# Patient Record
Sex: FEMALE | Race: WHITE | NOT HISPANIC OR LATINO | Employment: FULL TIME | URBAN - METROPOLITAN AREA
[De-identification: names, ages, dates, MRNs, and addresses within clinical notes are randomized per-mention and may not be internally consistent; named-entity substitution may affect disease eponyms.]

---

## 2018-11-18 ENCOUNTER — OFFICE VISIT (OUTPATIENT)
Dept: URGENT CARE | Age: 38
End: 2018-11-18
Payer: COMMERCIAL

## 2018-11-18 VITALS
SYSTOLIC BLOOD PRESSURE: 141 MMHG | HEIGHT: 65 IN | BODY MASS INDEX: 29.66 KG/M2 | DIASTOLIC BLOOD PRESSURE: 90 MMHG | TEMPERATURE: 97.7 F | OXYGEN SATURATION: 99 % | RESPIRATION RATE: 16 BRPM | WEIGHT: 178 LBS | HEART RATE: 87 BPM

## 2018-11-18 DIAGNOSIS — J06.9 ACUTE UPPER RESPIRATORY INFECTION: Primary | ICD-10-CM

## 2018-11-18 DIAGNOSIS — J01.40 ACUTE NON-RECURRENT PANSINUSITIS: ICD-10-CM

## 2018-11-18 PROCEDURE — 99213 OFFICE O/P EST LOW 20 MIN: CPT | Performed by: FAMILY MEDICINE

## 2018-11-18 RX ORDER — BLOOD-GLUCOSE CONTROL, NORMAL
EACH MISCELLANEOUS
COMMUNITY
Start: 2016-06-28

## 2018-11-18 RX ORDER — NORGESTIMATE AND ETHINYL ESTRADIOL 7DAYSX3 LO
KIT ORAL DAILY
COMMUNITY
Start: 2004-05-21

## 2018-11-18 RX ORDER — BUSPIRONE HYDROCHLORIDE 15 MG/1
15 TABLET ORAL
COMMUNITY
Start: 2018-07-09 | End: 2019-07-09

## 2018-11-18 RX ORDER — VENLAFAXINE HYDROCHLORIDE 75 MG/1
CAPSULE, EXTENDED RELEASE ORAL
COMMUNITY
Start: 2008-11-24

## 2018-11-18 RX ORDER — LISINOPRIL 5 MG/1
5 TABLET ORAL
COMMUNITY
Start: 2018-04-27 | End: 2019-04-27

## 2018-11-18 RX ORDER — AMOXICILLIN AND CLAVULANATE POTASSIUM 875; 125 MG/1; MG/1
1 TABLET, FILM COATED ORAL EVERY 12 HOURS SCHEDULED
Qty: 20 TABLET | Refills: 0 | Status: SHIPPED | OUTPATIENT
Start: 2018-11-18 | End: 2018-11-28

## 2018-11-18 RX ORDER — METOPROLOL SUCCINATE 50 MG/1
50 TABLET, EXTENDED RELEASE ORAL
COMMUNITY
Start: 2018-08-29

## 2018-11-18 NOTE — PROGRESS NOTES
[facility]        NAME: Noelle Vilchis is a 40 y o  female  : 1980    MRN: [id number]  DATE: 2018  TIME: 10:52 AM    Assessment and Plan   Acute upper respiratory infection [J06 9]  1  Acute upper respiratory infection     2  Acute non-recurrent pansinusitis  amoxicillin-clavulanate (AUGMENTIN) 875-125 mg per tablet         Patient Instructions     Patient Instructions   Augmentin twice a day until finished (please take probiotics)  Cold/cough/sinus medication as needed (try Mucinex DM and Flonase nasal spray 1 spray in each nostril once or twice a day)  Tylenol, or ibuprofen (Advil/Motrin), or try Aleve (please take with food) as needed  Recheck/follow-up with family physician  Please go to the hospital emergency department if needed  Follow up with PCP in 3-5 days  Proceed to  ER if symptoms worsen  Chief Complaint     Chief Complaint   Patient presents with    Sinusitis     pt complains of congestion, facial pain, runny nose, productive cough and coughing fits x1 month, pt took cold/sinus mediation last night and advil this morning          History of Present Illness       Congestion, sinus pressure, cough for the past month        Review of Systems   Review of Systems   HENT: Positive for congestion and sinus pressure  Respiratory: Positive for cough  Cardiovascular: Negative  Musculoskeletal: Negative  Skin: Negative  Neurological: Negative  Current Medications       Current Outpatient Prescriptions:     Blood Glucose Calibration (GLUCOSE CONTROL) Normal SOLN, Glucose control solution provides an easy way to ensure accurate blood glucose testing , Disp: , Rfl:     busPIRone (BUSPAR) 15 mg tablet, Take 15 mg by mouth, Disp: , Rfl:     Glucose Blood (TRUE FOCUS BLOOD GLUCOSE STRIP VI), One Touch Ultra test strips   Test 6 times a day dx E11 9 pregnancy on insulin, Disp: , Rfl:     lisinopril (ZESTRIL) 5 mg tablet, Take 5 mg by mouth, Disp: , Rfl:     metoprolol succinate (TOPROL-XL) 50 mg 24 hr tablet, Take 50 mg by mouth, Disp: , Rfl:     Multiple Vitamins-Minerals (THERAGRAN-M PO), Take 1 tablet by mouth, Disp: , Rfl:     norgestimate-ethinyl estradiol (ORTHO TRI-CYCLEN LO) 0 18/0 215/0 25 MG-25 MCG per tablet, Take by mouth daily, Disp: , Rfl:     sitaGLIPtin (JANUVIA) 100 mg tablet, Take by mouth, Disp: , Rfl:     venlafaxine (EFFEXOR XR) 75 mg 24 hr capsule, Take by mouth, Disp: , Rfl:     amoxicillin-clavulanate (AUGMENTIN) 875-125 mg per tablet, Take 1 tablet by mouth every 12 (twelve) hours for 20 doses, Disp: 20 tablet, Rfl: 0    Current Allergies     Allergies as of 2018 - Reviewed 2018   Allergen Reaction Noted    Lamotrigine Rash     Metformin Diarrhea 2018            The following portions of the patient's history were reviewed and updated as appropriate: allergies, current medications, past family history, past medical history, past social history, past surgical history and problem list      Past Medical History:   Diagnosis Date    Anxiety     Diabetes ([facility] [address] )     Hypertension        Past Surgical History:   Procedure Laterality Date     SECTION         History reviewed  No pertinent family history  Medications have been verified  Objective   /90 (BP Location: Left arm, Patient Position: Sitting, Cuff Size: Standard)   Pulse 87   Temp 97 7 °F (36 5 °C) (Temporal)   Resp 16   Ht 5' 5" (1 651 m)   Wt 80 7 kg (178 lb)   LMP 2018 (Exact Date)   SpO2 99%   Breastfeeding? No   BMI 29 62 kg/m²        Physical Exam     Physical Exam   Constitutional: She is oriented to person, place, and time  She appears well-developed and well-nourished  HENT:   Right Ear: External ear normal    Left Ear: External ear normal    Nasal congestion; tenderness over the sinuses; injection of the oropharynx   Neck: Normal range of motion  Neck supple     Pulmonary/Chest: Effort normal  No respiratory distress  She has no wheezes  Coarse breath sounds   Neurological: She is alert and oriented to person, place, and time  No nuchal rigidity   Skin: Skin is warm  Good color and turgor   Psychiatric: She has a normal mood and affect  Her behavior is normal    Nursing note and vitals reviewed

## 2018-11-18 NOTE — PATIENT INSTRUCTIONS
Augmentin twice a day until finished (please take probiotics)  Cold/cough/sinus medication as needed (try Mucinex DM and Flonase nasal spray 1 spray in each nostril once or twice a day)  Tylenol, or ibuprofen (Advil/Motrin), or try Aleve (please take with food) as needed  Recheck/follow-up with family physician  Please go to the hospital emergency department if needed

## 2019-08-07 ENCOUNTER — OFFICE VISIT (OUTPATIENT)
Dept: URGENT CARE | Age: 39
End: 2019-08-07
Payer: COMMERCIAL

## 2019-08-07 VITALS
HEART RATE: 89 BPM | OXYGEN SATURATION: 98 % | HEIGHT: 65 IN | SYSTOLIC BLOOD PRESSURE: 116 MMHG | TEMPERATURE: 97.9 F | RESPIRATION RATE: 16 BRPM | DIASTOLIC BLOOD PRESSURE: 84 MMHG | WEIGHT: 186 LBS | BODY MASS INDEX: 30.99 KG/M2

## 2019-08-07 DIAGNOSIS — B09 VIRAL RASH: Primary | ICD-10-CM

## 2019-08-07 PROCEDURE — 99213 OFFICE O/P EST LOW 20 MIN: CPT | Performed by: NURSE PRACTITIONER

## 2019-08-07 RX ORDER — LISINOPRIL 5 MG/1
5 TABLET ORAL DAILY
COMMUNITY
Start: 2019-04-11

## 2019-08-07 RX ORDER — BUSPIRONE HYDROCHLORIDE 15 MG/1
15 TABLET ORAL 2 TIMES DAILY
COMMUNITY
Start: 2019-07-29

## 2019-08-07 RX ORDER — VALACYCLOVIR HYDROCHLORIDE 1 G/1
1000 TABLET, FILM COATED ORAL 3 TIMES DAILY
Qty: 21 TABLET | Refills: 0 | Status: SHIPPED | OUTPATIENT
Start: 2019-08-07 | End: 2019-08-14

## 2019-08-07 RX ORDER — MEDROXYPROGESTERONE ACETATE 150 MG/ML
150 INJECTION, SUSPENSION INTRAMUSCULAR
COMMUNITY
Start: 2019-06-24

## 2019-08-07 NOTE — PATIENT INSTRUCTIONS
Viral Exanthem   WHAT YOU NEED TO KNOW:   Viral exanthem is a skin rash  It is your child's body's response to a virus  The rash usually goes away on its own  Your child's rash may last from a few days to a month or more  DISCHARGE INSTRUCTIONS:   Medicines:   · Medicines  to treat fever, pain, and itching may be given  Your child may also receive medicines to treat an infection  · NSAIDs , such as ibuprofen, help decrease swelling, pain, and fever  This medicine is available with or without a doctor's order  NSAIDs can cause stomach bleeding or kidney problems in certain people  If your child takes blood thinner medicine, always ask if NSAIDs are safe for him  Always read the medicine label and follow directions  Do not give these medicines to children under 10months of age without direction from your child's healthcare provider  · Do not give aspirin to children under 25years of age  Your child could develop Reye syndrome if he takes aspirin  Reye syndrome can cause life-threatening brain and liver damage  Check your child's medicine labels for aspirin, salicylates, or oil of wintergreen  Follow up with your child's pediatrician as directed:  Write down your questions so you remember to ask them during your visits  Manage your child's rash:   · Apply calamine lotion on your child's rash  This lotion may help relieve itching  Follow the directions on the label  Do not use this lotion on sores inside your child's mouth  · Give your child baths in lukewarm water  Add ½ cup of baking soda or uncooked oatmeal to the water  Let your child bathe for about 30 minutes  Do this several times a day to help your child stop itching  · Trim your child's fingernails  Put gloves or socks on his hands, especially at night  Wash his hands with germ-killing soap to prevent a bacterial infection  · Keep your child cool  The itching can get worse if your child sweats    Contact your child's healthcare provider if:   · Your child's rash has turned into sores that drain blood or pus  · Your child has repeated diarrhea  · Your child has ear pain or is pulling at his ears  · Your child has joint pain for more than 4 months after his rash has gone away  · You have questions or concerns about your child's condition or care  Return to the emergency department if:   · Your child's temperature is more than 102° F (38 9° C) and he is dizzy when he sits up  · Your child is having seizures  · Your child cannot turn his head without pain or complains of a stiff neck  © 2017 [employer] Information is for End User's use only and may not be sold, redistributed or otherwise used for commercial purposes  All illustrations and images included in CareNotes® are the copyrighted property of [employer]  or [employer]  The above information is an  only  It is not intended as medical advice for individual conditions or treatments  Talk to your doctor, nurse or pharmacist before following any medical regimen to see if it is safe and effective for you

## 2019-08-07 NOTE — PROGRESS NOTES
[facility]        NAME: Tatianna Gregg is a 41 y o  female  : 1980    MRN: [id number]  DATE: 2019  TIME: 11:05 AM    Assessment and Plan   Viral rash [B09]  1  Viral rash  valACYclovir (VALTREX) 1,000 mg tablet         Patient Instructions     Take meds as directed  F/u with PCP to get you tested for HSV  Follow up with PCP in 3-5 days  Proceed to  ER if symptoms worsen  Chief Complaint     Chief Complaint   Patient presents with    Rash     Pt complaining of a rash under her R eye and under her lip  Under her eye has been there for 2 months  Under her lip started 2 weeks ago  Rash is itchy  Denies use of any new products  History of Present Illness       HPI   Reports rash underneath the right eye since 2019, and now underneath the right side of the lip for 2 weeks  Says her mother has been bugging her to come and get it checked  Has an annoying feeling to it  No pain or itching  Review of Systems   Review of Systems   Constitutional: Negative for chills, fatigue and unexpected weight change  HENT: Negative for sore throat and trouble swallowing  Eyes: Negative for visual disturbance  Respiratory: Negative for cough and wheezing  Cardiovascular: Negative for chest pain  Skin: Positive for rash (as described above)  Neurological: Negative for dizziness and numbness  Current Medications       Current Outpatient Medications:     Blood Glucose Calibration (GLUCOSE CONTROL) Normal SOLN, Glucose control solution provides an easy way to ensure accurate blood glucose testing , Disp: , Rfl:     busPIRone (BUSPAR) 15 mg tablet, Take 15 mg by mouth 2 (two) times a day, Disp: , Rfl:     Glucose Blood (TRUE FOCUS BLOOD GLUCOSE STRIP VI), One Touch Ultra test strips   Test 6 times a day dx E11 9 pregnancy on insulin, Disp: , Rfl:     insulin degludec (TRESIBA FLEXTOUCH) 100 units/mL injection pen, Inject up to 40 units once daily E11 65, Disp: , Rfl:   lisinopril (ZESTRIL) 5 mg tablet, Take 5 mg by mouth daily, Disp: , Rfl:     medroxyPROGESTERone acetate (DEPO-PROVERA) 150 mg/mL injection, Inject 150 mg into a muscle, Disp: , Rfl:     metoprolol succinate (TOPROL-XL) 50 mg 24 hr tablet, Take 50 mg by mouth, Disp: , Rfl:     Multiple Vitamins-Minerals (THERAGRAN-M PO), Take 1 tablet by mouth, Disp: , Rfl:     venlafaxine (EFFEXOR XR) 75 mg 24 hr capsule, Take by mouth, Disp: , Rfl:     busPIRone (BUSPAR) 15 mg tablet, Take 15 mg by mouth, Disp: , Rfl:     lisinopril (ZESTRIL) 5 mg tablet, Take 5 mg by mouth, Disp: , Rfl:     norgestimate-ethinyl estradiol (ORTHO TRI-CYCLEN LO) 0 18/0 215/0 25 MG-25 MCG per tablet, Take by mouth daily, Disp: , Rfl:     sitaGLIPtin (JANUVIA) 100 mg tablet, Take by mouth, Disp: , Rfl:     valACYclovir (VALTREX) 1,000 mg tablet, Take 1 tablet (1,000 mg total) by mouth 3 (three) times a day for 7 days, Disp: 21 tablet, Rfl: 0    Current Allergies     Allergies as of 2019 - Reviewed 2019   Allergen Reaction Noted    Lamotrigine Rash     Metformin Diarrhea 2018            The following portions of the patient's history were reviewed and updated as appropriate: allergies, current medications, past family history, past medical history, past social history, past surgical history and problem list      Past Medical History:   Diagnosis Date    Anxiety     Diabetes ([address] )     Hypertension        Past Surgical History:   Procedure Laterality Date     SECTION         Family History   Problem Relation Age of Onset    No Known Problems Mother     No Known Problems Father          Medications have been verified          Objective   /84 (BP Location: Right arm, Patient Position: Sitting)   Pulse 89   Temp 97 9 °F (36 6 °C) (Temporal)   Resp 16   Ht 5' 5" (1 651 m)   Wt 84 4 kg (186 lb)   LMP 2019   SpO2 98%   BMI 30 95 kg/m²        Physical Exam     Physical Exam   Eyes: Pupils are equal, round, and reactive to light  Conjunctivae and EOM are normal  Right eye exhibits no discharge  Left eye exhibits no discharge  There is about 3-4 small rash lesion underneath the right eye lid  Similar rash also present underneath the lip, right side  Measures about 3-4 mm  Slightly elevated  Minimal crusting noted   Shingles Vs HSV

## 2021-03-10 DIAGNOSIS — Z23 ENCOUNTER FOR IMMUNIZATION: ICD-10-CM

## 2021-03-13 ENCOUNTER — IMMUNIZATIONS (OUTPATIENT)
Dept: FAMILY MEDICINE CLINIC | Facility: HOSPITAL | Age: 41
End: 2021-03-13

## 2021-03-13 DIAGNOSIS — Z23 ENCOUNTER FOR IMMUNIZATION: Primary | ICD-10-CM

## 2021-03-13 PROCEDURE — 0001A SARS-COV-2 / COVID-19 MRNA VACCINE (PFIZER-BIONTECH) 30 MCG: CPT

## 2021-03-13 PROCEDURE — 91300 SARS-COV-2 / COVID-19 MRNA VACCINE (PFIZER-BIONTECH) 30 MCG: CPT

## 2021-04-03 ENCOUNTER — IMMUNIZATIONS (OUTPATIENT)
Dept: FAMILY MEDICINE CLINIC | Facility: HOSPITAL | Age: 41
End: 2021-04-03

## 2021-04-03 DIAGNOSIS — Z23 ENCOUNTER FOR IMMUNIZATION: Primary | ICD-10-CM

## 2021-04-03 PROCEDURE — 0002A SARS-COV-2 / COVID-19 MRNA VACCINE (PFIZER-BIONTECH) 30 MCG: CPT

## 2021-04-03 PROCEDURE — 91300 SARS-COV-2 / COVID-19 MRNA VACCINE (PFIZER-BIONTECH) 30 MCG: CPT

## 2021-05-09 ENCOUNTER — OFFICE VISIT (OUTPATIENT)
Dept: URGENT CARE | Age: 41
End: 2021-05-09
Payer: COMMERCIAL

## 2021-05-09 ENCOUNTER — APPOINTMENT (OUTPATIENT)
Dept: RADIOLOGY | Age: 41
End: 2021-05-09
Attending: PHYSICIAN ASSISTANT
Payer: COMMERCIAL

## 2021-05-09 VITALS
SYSTOLIC BLOOD PRESSURE: 143 MMHG | HEART RATE: 112 BPM | RESPIRATION RATE: 20 BRPM | TEMPERATURE: 97.5 F | DIASTOLIC BLOOD PRESSURE: 92 MMHG | OXYGEN SATURATION: 99 %

## 2021-05-09 DIAGNOSIS — R52 PAIN: ICD-10-CM

## 2021-05-09 DIAGNOSIS — S29.011A MUSCLE STRAIN OF CHEST WALL, INITIAL ENCOUNTER: Primary | ICD-10-CM

## 2021-05-09 PROCEDURE — 99213 OFFICE O/P EST LOW 20 MIN: CPT | Performed by: PHYSICIAN ASSISTANT

## 2021-05-09 PROCEDURE — 71101 X-RAY EXAM UNILAT RIBS/CHEST: CPT

## 2021-05-09 NOTE — PATIENT INSTRUCTIONS
Ice area as needed  Recommend splinting the area for any cough or sneeze  Encourage taking deep breaths  Follow-up with your primary care physician if symptoms persist       Go to emergency room if symptoms are worsening  May take ibuprofen or Tylenol as needed

## 2021-05-09 NOTE — PROGRESS NOTES
[facility]        NAME: Akosua Gonzales is a 45 y o  female  : 1980    MRN: [id number]  DATE: May 9, 2021  TIME: 5:51 PM    Assessment and Plan   Muscle strain of chest wall, initial encounter [S29 011A]  1  Muscle strain of chest wall, initial encounter  XR ribs right w pa chest min 3 views         Patient Instructions       Follow up with PCP in 3-5 days  Proceed to  ER if symptoms worsen  Chief Complaint     Chief Complaint   Patient presents with    Rib Pain     pt states had a bad coughing episode on 21 and started with right rib pain, was seen at [facility] and was told she had a strain and pneumonia  Pt states after antibiotics pain got better  On Thursday pt had another coughing fit and developed pain in the same area, hurts to cough or take a deep breath, painful to palpation         History of Present Illness         Patient here for evaluation of pain in her right lateral chest wall  Patient had similar symptoms and was seen at [facility] and had x-rays done which were negative for any fractures  Patient was diagnosed with pneumonia was put on antibiotics  Patient states she did get better and the other day on Tuesday she was having other coughing fit and felt the pain return  She denies any shortness of breath or dyspnea on exertion  Patient has increased pain with movement or deep breathing or coughing or sneezing  She states it does feel better at rest       Review of Systems   Review of Systems   Constitutional: Negative  Respiratory: Positive for cough  Negative for apnea, choking, chest tightness, shortness of breath and wheezing  Cardiovascular: Positive for chest pain  Negative for palpitations and leg swelling           Current Medications       Current Outpatient Medications:     Blood Glucose Calibration (GLUCOSE CONTROL) Normal SOLN, Glucose control solution provides an easy way to ensure accurate blood glucose testing , Disp: , Rfl:    busPIRone (BUSPAR) 15 mg tablet, Take 15 mg by mouth 2 (two) times a day, Disp: , Rfl:     Glucose Blood (TRUE FOCUS BLOOD GLUCOSE STRIP VI), One Touch Ultra test strips  Test 6 times a day dx E11 9 pregnancy on insulin, Disp: , Rfl:     insulin degludec (TRESIBA FLEXTOUCH) 100 units/mL injection pen, Inject up to 40 units once daily E11 65, Disp: , Rfl:     lisinopril (ZESTRIL) 5 mg tablet, Take 5 mg by mouth daily, Disp: , Rfl:     medroxyPROGESTERone acetate (DEPO-PROVERA) 150 mg/mL injection, Inject 150 mg into a muscle, Disp: , Rfl:     metoprolol succinate (TOPROL-XL) 50 mg 24 hr tablet, Take 50 mg by mouth, Disp: , Rfl:     Multiple Vitamins-Minerals (THERAGRAN-M PO), Take 1 tablet by mouth, Disp: , Rfl:     norgestimate-ethinyl estradiol (ORTHO TRI-CYCLEN LO) 0 18/0 215/0 25 MG-25 MCG per tablet, Take by mouth daily, Disp: , Rfl:     sitaGLIPtin (JANUVIA) 100 mg tablet, Take by mouth, Disp: , Rfl:     valACYclovir (VALTREX) 1,000 mg tablet, Take 1 tablet (1,000 mg total) by mouth 3 (three) times a day for 7 days, Disp: 21 tablet, Rfl: 0    venlafaxine (EFFEXOR XR) 75 mg 24 hr capsule, Take by mouth, Disp: , Rfl:     Current Allergies     Allergies as of 2021 - Reviewed 2021   Allergen Reaction Noted    Lamotrigine Rash     Metformin Diarrhea 2018            The following portions of the patient's history were reviewed and updated as appropriate: allergies, current medications, past family history, past medical history, past social history, past surgical history and problem list      Past Medical History:   Diagnosis Date    Anxiety     Diabetes ([address] )     Hypertension        Past Surgical History:   Procedure Laterality Date     SECTION         Family History   Problem Relation Age of Onset    No Known Problems Mother     No Known Problems Father          Medications have been verified          Objective   /92   Pulse (!) 112   Temp 97 5 °F (36 4 °C)   Resp 20   SpO2 99%   No LMP recorded  (Menstrual status: Birth Control)  Physical Exam     Physical Exam  Vitals signs and nursing note reviewed  Constitutional:       General: She is not in acute distress  Appearance: Normal appearance  She is well-developed  She is not ill-appearing, toxic-appearing or diaphoretic  HENT:      Head: Normocephalic and atraumatic  Eyes:      Extraocular Movements: Extraocular movements intact  Conjunctiva/sclera: Conjunctivae normal       Pupils: Pupils are equal, round, and reactive to light  Pulmonary:      Effort: Pulmonary effort is normal  No respiratory distress  Breath sounds: Normal breath sounds  No stridor  No wheezing, rhonchi or rales  Chest:      Chest wall: Tenderness (Patient has some pain in the same area with movement of the torso and right upper extremity ) present  Skin:     General: Skin is warm and dry  Findings: No rash  Neurological:      General: No focal deficit present  Mental Status: She is alert and oriented to person, place, and time  Psychiatric:         Mood and Affect: Mood normal          Behavior: Behavior normal          Thought Content: Thought content normal          Judgment: Judgment normal        X-ray of the right ribs and PA chest show no acute fractures or findings  No evidence of pneumonia

## 2024-07-21 ENCOUNTER — OFFICE VISIT (OUTPATIENT)
Dept: URGENT CARE | Age: 44
End: 2024-07-21
Payer: COMMERCIAL

## 2024-07-21 VITALS
SYSTOLIC BLOOD PRESSURE: 118 MMHG | HEART RATE: 90 BPM | RESPIRATION RATE: 20 BRPM | BODY MASS INDEX: 34.4 KG/M2 | WEIGHT: 206.7 LBS | OXYGEN SATURATION: 97 % | TEMPERATURE: 97.1 F | DIASTOLIC BLOOD PRESSURE: 77 MMHG

## 2024-07-21 DIAGNOSIS — H60.331 ACUTE SWIMMER'S EAR OF RIGHT SIDE: Primary | ICD-10-CM

## 2024-07-21 PROCEDURE — 99213 OFFICE O/P EST LOW 20 MIN: CPT

## 2024-07-21 RX ORDER — OFLOXACIN 3 MG/ML
5 SOLUTION AURICULAR (OTIC) 2 TIMES DAILY
Qty: 10 ML | Refills: 0 | Status: SHIPPED | OUTPATIENT
Start: 2024-07-21

## 2024-07-21 NOTE — PROGRESS NOTES
[facility]        NAME: Genesis Dong is a 43 y.o. female  : 1980    MRN: [id number]  DATE: 2024  TIME: 1:19 PM    Assessment and Plan   Acute swimmer's ear of right side [H60.331]  1. Acute swimmer's ear of right side  ofloxacin (FLOXIN) 0.3 % otic solution            Patient Instructions       Follow up with PCP in 3-5 days.  Proceed to  ER if symptoms worsen.    If tests have been performed at [facility], our office will contact you with results if changes need to be made to the care plan discussed with you at the visit.  You can review your full results on [facility].    Chief Complaint     Chief Complaint   Patient presents with   • Earache     Started on yesterday right ear pain, did get somewhat better . Patient reports started again this morning. Denies fevers or chills. Patient has been swimming a lot .          History of Present Illness       Pt is a 43 year old female presenting with 2 days of right ear pain.  States she was swimming earlier this week with her children, and went under the water.  States her hearing is muffled on the right side.  She denies fever or chills, no drainage from the ear, no headaches.  She is taken ibuprofen with minimal relief.    Earache         Review of Systems   Review of Systems   Constitutional:  Negative for chills and fever.   HENT:  Positive for ear pain. Negative for postnasal drip.          Current Medications       Current Outpatient Medications:   •  busPIRone (BUSPAR) 15 mg tablet, Take 15 mg by mouth 2 (two) times a day, Disp: , Rfl:   •  Glucose Blood (TRUE FOCUS BLOOD GLUCOSE STRIP VI), One Touch Ultra test strips. Test 6 times a day dx E11.9 pregnancy on insulin, Disp: , Rfl:   •  insulin degludec (TRESIBA FLEXTOUCH) 100 units/mL injection pen, Inject up to 40 units once daily E11.65, Disp: , Rfl:   •  lisinopril (ZESTRIL) 5 mg tablet, Take 5 mg by mouth daily, Disp: , Rfl:   •  medroxyPROGESTERone acetate (DEPO-PROVERA)  150 mg/mL injection, Inject 150 mg into a muscle, Disp: , Rfl:   •  metoprolol succinate (TOPROL-XL) 50 mg 24 hr tablet, Take 50 mg by mouth, Disp: , Rfl:   •  Multiple Vitamins-Minerals (THERAGRAN-M PO), Take 1 tablet by mouth, Disp: , Rfl:   •  norgestimate-ethinyl estradiol (ORTHO TRI-CYCLEN LO) 0.18/0.215/0.25 MG-25 MCG per tablet, Take by mouth daily, Disp: , Rfl:   •  ofloxacin (FLOXIN) 0.3 % otic solution, Administer 5 drops to the right ear 2 (two) times a day, Disp: 10 mL, Rfl: 0  •  sitaGLIPtin (JANUVIA) 100 mg tablet, Take by mouth, Disp: , Rfl:   •  venlafaxine (EFFEXOR XR) 75 mg 24 hr capsule, Take by mouth, Disp: , Rfl:   •  Blood Glucose Calibration (GLUCOSE CONTROL) Normal SOLN, Glucose control solution provides an easy way to ensure accurate blood glucose testing., Disp: , Rfl:   •  valACYclovir (VALTREX) 1,000 mg tablet, Take 1 tablet (1,000 mg total) by mouth 3 (three) times a day for 7 days, Disp: 21 tablet, Rfl: 0    Current Allergies     Allergies as of 2024 - Reviewed 2024   Allergen Reaction Noted   • Lamotrigine Rash    • Metformin Diarrhea 2018            The following portions of the patient's history were reviewed and updated as appropriate: allergies, current medications, past family history, past medical history, past social history, past surgical history and problem list.     Past Medical History:   Diagnosis Date   • Anxiety    • Diabetes (HCC)    • Hypertension        Past Surgical History:   Procedure Laterality Date   •  SECTION         Family History   Problem Relation Age of Onset   • No Known Problems Mother    • No Known Problems Father          Medications have been verified.        Objective   /77   Pulse 90   Temp (!) 97.1 °F (36.2 °C) (Temporal)   Resp 20   Wt 93.8 kg (206 lb 11.2 oz)   LMP 2024   SpO2 97%   BMI 34.40 kg/m²   Patient's last menstrual period was 2024.       Physical Exam     Physical Exam

## 2024-07-21 NOTE — PATIENT INSTRUCTIONS
Take additional tylenol or motrin for pain.  Do not put any cotton in your ears.  Use ear drops as directed.  Do no swim for 14 days.

## 2024-11-19 ENCOUNTER — OFFICE VISIT (OUTPATIENT)
Dept: URGENT CARE | Age: 44
End: 2024-11-19
Payer: COMMERCIAL

## 2024-11-19 VITALS
HEART RATE: 91 BPM | RESPIRATION RATE: 16 BRPM | SYSTOLIC BLOOD PRESSURE: 116 MMHG | DIASTOLIC BLOOD PRESSURE: 83 MMHG | OXYGEN SATURATION: 97 % | TEMPERATURE: 98.3 F | WEIGHT: 200 LBS | HEIGHT: 65 IN | BODY MASS INDEX: 33.32 KG/M2

## 2024-11-19 DIAGNOSIS — J01.90 ACUTE SINUSITIS, RECURRENCE NOT SPECIFIED, UNSPECIFIED LOCATION: Primary | ICD-10-CM

## 2024-11-19 PROCEDURE — 99213 OFFICE O/P EST LOW 20 MIN: CPT

## 2024-11-19 RX ORDER — AMOXICILLIN 500 MG/1
500 CAPSULE ORAL EVERY 8 HOURS SCHEDULED
Qty: 21 CAPSULE | Refills: 0 | Status: SHIPPED | OUTPATIENT
Start: 2024-11-19 | End: 2024-11-26

## 2024-11-19 RX ORDER — INSULIN ASPART 100 [IU]/ML
5 INJECTION, SOLUTION INTRAVENOUS; SUBCUTANEOUS
COMMUNITY
Start: 2024-07-20

## 2024-11-19 RX ORDER — TIRZEPATIDE 5 MG/.5ML
5 INJECTION, SOLUTION SUBCUTANEOUS WEEKLY
COMMUNITY
Start: 2024-10-01

## 2024-11-19 RX ORDER — ATORVASTATIN CALCIUM 10 MG/1
10 TABLET, FILM COATED ORAL DAILY
COMMUNITY
Start: 2024-10-23

## 2024-11-19 RX ORDER — FLUTICASONE PROPIONATE 50 MCG
1 SPRAY, SUSPENSION (ML) NASAL DAILY
Qty: 11.1 ML | Refills: 0 | Status: SHIPPED | OUTPATIENT
Start: 2024-11-19

## 2024-11-20 NOTE — PATIENT INSTRUCTIONS
Please begin ocean saline spray and Flonase as directed   Ensure good hydration.   May start antibiotics if no improvement in symptoms within 3-5 days.   Follow up with PCP if no relief within one week.

## 2024-11-20 NOTE — PROGRESS NOTES
[facility]        NAME: Genesis Dong is a 43 y.o. female  : 1980    MRN: [id number]  DATE: 2024  TIME: 8:42 PM    Assessment and Plan   Acute sinusitis, recurrence not specified, unspecified location [J01.90]  1. Acute sinusitis, recurrence not specified, unspecified location  sodium chloride (OCEAN) 0.65 % nasal spray    fluticasone (FLONASE) 50 mcg/act nasal spray    amoxicillin (AMOXIL) 500 mg capsule      Please begin ocean saline spray and Flonase as directed   Ensure good hydration.   May start antibiotics if no improvement in symptoms within 3-5 days.   Follow up with PCP if no relief within one week.       Patient Instructions   Patient Education     Sinusitis, Adult ED   General Information   You came to the Emergency Department (ED) for sinusitis. Your sinuses are hollow areas in the bones of your face. They have a thin lining that normally makes a small amount of mucus. When you have sinusitis, the lining gets swollen and makes extra mucus. You may have sinusitis with or after a cold. Most of the time sinusitis will get better in 1 to 2 weeks.  Sinusitis is most often caused by a virus, so antibiotics won’t help. But some people do need antibiotics. If the doctor ordered antibiotics for you, be sure to follow the instructions. It is important to take all of your antibiotics even if you start to feel better.  What care is needed at home?   Call your regular doctor to let them know you were in the ED. Make a follow-up appointment if you were told to.  Try to thin the mucus.  Drink lots of liquids to stay hydrated.  Use a cool mist humidifier to avoid dry air.  Use saline nose drops or a saline nose rinse to relieve stuffiness.  Wash your hands often. This will help keep others healthy.  Do not smoke or be in smoke-filled places. Avoid things that may cause breathing problems like fumes, pollution, dust, and other common allergens and irritants.  You may want to take  medicine like ibuprofen, naproxen, or acetaminophen to help with pain.  When do I need to get emergency help?   Return to the ED if:   You have a stiff neck, especially if you also have fever, chills, vomiting, or severe headache  You have trouble thinking clearly.  You have trouble seeing or have double vision.  You have swelling or redness or pain around one or both eyes.  You have a fever of 102°F (38.9°C) or higher, or have shaking chills or sweats.  When do I need to call the doctor?   You have an upset stomach and throwing up.  You have more pain in your face and head.  You are not getting better within 1 to 2 weeks.  You have new or worsening symptoms.  Last Reviewed Date   2020-08-03  Consumer Information Use and Disclaimer   This generalized information is a limited summary of diagnosis, treatment, and/or medication information. It is not meant to be comprehensive and should be used as a tool to help the user understand and/or assess potential diagnostic and treatment options. It does NOT include all information about conditions, treatments, medications, side effects, or risks that may apply to a specific patient. It is not intended to be medical advice or a substitute for the medical advice, diagnosis, or treatment of a health care provider based on the health care provider's examination and assessment of a patient’s specific and unique circumstances. Patients must speak with a health care provider for complete information about their health, medical questions, and treatment options, including any risks or benefits regarding use of medications. This information does not endorse any treatments or medications as safe, effective, or approved for treating a specific patient. [employer] and its affiliates disclaim any warranty or liability relating to this information or the use thereof. The use of this information is governed by the Terms of Use, available at  https://www.[employer].com/en/know/clinical-effectiveness-terms   Copyright   Copyright © 2024 [employer] and its affiliates and/or licensors. All rights reserved.    Follow up with PCP in 3-5 days.  Proceed to  ER if symptoms worsen.    Chief Complaint     Chief Complaint   Patient presents with    Cough    Earache    Headache     Per patient, she has sinus pressure/headache, right earache, and cough. She also said she has right upper quad pain.         History of Present Illness       Sinusitis  This is a new problem. The current episode started in the past 7 days (x 1 week). The problem has been gradually worsening since onset. There has been no fever. She is experiencing no pain. Associated symptoms include congestion, ear pain and sinus pressure. Pertinent negatives include no chills, coughing, diaphoresis, headaches, hoarse voice, neck pain, shortness of breath, sneezing, sore throat or swollen glands. (Patient also noted slight RUQ abdominal pain today, seems to be overlying ribs. Denies trauma/injury. ) Past treatments include nothing. The treatment provided no relief.       Review of Systems   Review of Systems   Constitutional:  Negative for chills, diaphoresis, fatigue and fever.   HENT:  Positive for congestion, ear pain and sinus pressure. Negative for ear discharge, hoarse voice, postnasal drip, rhinorrhea, sinus pain, sneezing and sore throat.    Eyes: Negative.  Negative for pain, discharge, redness and itching.   Respiratory: Negative.  Negative for apnea, cough, choking, chest tightness, shortness of breath, wheezing and stridor.    Cardiovascular: Negative.  Negative for chest pain and palpitations.   Gastrointestinal:  Positive for abdominal pain. Negative for diarrhea, nausea and vomiting.   Endocrine: Negative.  Negative for polydipsia, polyphagia and polyuria.   Genitourinary: Negative.  Negative for decreased urine volume and flank pain.   Musculoskeletal: Negative.  Negative for  arthralgias, back pain, myalgias, neck pain and neck stiffness.   Skin: Negative.  Negative for color change and rash.   Allergic/Immunologic: Negative.  Negative for environmental allergies.   Neurological: Negative.  Negative for dizziness, facial asymmetry, light-headedness, numbness and headaches.   Hematological: Negative.  Negative for adenopathy.   Psychiatric/Behavioral: Negative.           Current Medications       Current Outpatient Medications:     amoxicillin (AMOXIL) 500 mg capsule, Take 1 capsule (500 mg total) by mouth every 8 (eight) hours for 7 days, Disp: 21 capsule, Rfl: 0    atorvastatin (LIPITOR) 10 mg tablet, Take 10 mg by mouth daily, Disp: , Rfl:     Blood Glucose Calibration (GLUCOSE CONTROL) Normal SOLN, Glucose control solution provides an easy way to ensure accurate blood glucose testing., Disp: , Rfl:     busPIRone (BUSPAR) 15 mg tablet, Take 15 mg by mouth 2 (two) times a day, Disp: , Rfl:     fluticasone (FLONASE) 50 mcg/act nasal spray, 1 spray into each nostril daily, Disp: 11.1 mL, Rfl: 0    Glucose Blood (TRUE FOCUS BLOOD GLUCOSE STRIP VI), One Touch Ultra test strips. Test 6 times a day dx E11.9 pregnancy on insulin, Disp: , Rfl:     Insulin Aspart (NovoLOG PenFill) 100 UNITS/ML cartridge for injection, Inject 5 Units under the skin 3 (three) times a day with meals, Disp: , Rfl:     insulin degludec (TRESIBA FLEXTOUCH) 100 units/mL injection pen, Inject up to 40 units once daily E11.65, Disp: , Rfl:     lisinopril (ZESTRIL) 5 mg tablet, Take 5 mg by mouth daily, Disp: , Rfl:     metoprolol succinate (TOPROL-XL) 50 mg 24 hr tablet, Take 50 mg by mouth, Disp: , Rfl:     Mounjaro 5 MG/0.5ML SOAJ, Inject 5 mg under the skin once a week, Disp: , Rfl:     Multiple Vitamins-Minerals (THERAGRAN-M PO), Take 1 tablet by mouth, Disp: , Rfl:     sodium chloride (OCEAN) 0.65 % nasal spray, 1 spray into each nostril as needed for congestion or rhinitis, Disp: 30 mL, Rfl: 0    venlafaxine  "(EFFEXOR XR) 75 mg 24 hr capsule, Take by mouth, Disp: , Rfl:     medroxyPROGESTERone acetate (DEPO-PROVERA) 150 mg/mL injection, Inject 150 mg into a muscle (Patient not taking: Reported on 2024), Disp: , Rfl:     norgestimate-ethinyl estradiol (ORTHO TRI-CYCLEN LO) 0.18/0.215/0.25 MG-25 MCG per tablet, Take by mouth daily (Patient not taking: Reported on 2024), Disp: , Rfl:     ofloxacin (FLOXIN) 0.3 % otic solution, Administer 5 drops to the right ear 2 (two) times a day (Patient not taking: Reported on 2024), Disp: 10 mL, Rfl: 0    sitaGLIPtin (JANUVIA) 100 mg tablet, Take by mouth (Patient not taking: Reported on 2024), Disp: , Rfl:     valACYclovir (VALTREX) 1,000 mg tablet, Take 1 tablet (1,000 mg total) by mouth 3 (three) times a day for 7 days, Disp: 21 tablet, Rfl: 0    Current Allergies     Allergies as of 2024 - Reviewed 2024   Allergen Reaction Noted    Lamotrigine Rash     Metformin Diarrhea 2018            The following portions of the patient's history were reviewed and updated as appropriate: allergies, current medications, past family history, past medical history, past social history, past surgical history and problem list.     Past Medical History:   Diagnosis Date    Anxiety     Diabetes (HCC)     Hypertension        Past Surgical History:   Procedure Laterality Date     SECTION         Family History   Problem Relation Age of Onset    No Known Problems Mother     No Known Problems Father          Medications have been verified.        Objective   /83 (BP Location: Left arm, Patient Position: Sitting)   Pulse 91   Temp 98.3 °F (36.8 °C) (Tympanic)   Resp 16   Ht 5' 5\" (1.651 m)   Wt 90.7 kg (200 lb)   LMP 2024   SpO2 97%   BMI 33.28 kg/m²        Physical Exam     Physical Exam  Vitals and nursing note reviewed.   Constitutional:       General: She is not in acute distress.     Appearance: Normal appearance. She is not " ill-appearing, toxic-appearing or diaphoretic.      Interventions: She is not intubated.  HENT:      Head: Normocephalic and atraumatic.      Right Ear: Tympanic membrane, ear canal and external ear normal. There is no impacted cerumen.      Left Ear: Tympanic membrane, ear canal and external ear normal. There is no impacted cerumen.      Nose: Nose normal. No congestion or rhinorrhea.      Mouth/Throat:      Mouth: Mucous membranes are moist.   Eyes:      Extraocular Movements: Extraocular movements intact.      Conjunctiva/sclera: Conjunctivae normal.      Pupils: Pupils are equal, round, and reactive to light.   Cardiovascular:      Rate and Rhythm: Normal rate and regular rhythm.      Pulses: Normal pulses.      Heart sounds: Normal heart sounds, S1 normal and S2 normal. Heart sounds not distant. No murmur heard.     No friction rub. No gallop.   Pulmonary:      Effort: Pulmonary effort is normal. No tachypnea, bradypnea, accessory muscle usage, prolonged expiration, respiratory distress or retractions. She is not intubated.      Breath sounds: Normal breath sounds. No stridor, decreased air movement or transmitted upper airway sounds. No decreased breath sounds, wheezing, rhonchi or rales.   Abdominal:      General: Abdomen is flat. Bowel sounds are normal.      Palpations: Abdomen is soft.      Tenderness: There is no abdominal tenderness. There is no right CVA tenderness, left CVA tenderness, guarding or rebound.      Comments: No reproducible abdominal TTP on exam.    Musculoskeletal:         General: Normal range of motion.      Cervical back: Normal range of motion and neck supple. No tenderness.   Skin:     General: Skin is warm and dry.      Capillary Refill: Capillary refill takes less than 2 seconds.   Neurological:      General: No focal deficit present.      Mental Status: She is alert and oriented to person, place, and time.      Cranial Nerves: No cranial nerve deficit.   Psychiatric:         Mood  and Affect: Mood normal.         Behavior: Behavior normal.

## 2025-01-13 DIAGNOSIS — J01.90 ACUTE SINUSITIS, RECURRENCE NOT SPECIFIED, UNSPECIFIED LOCATION: ICD-10-CM

## 2025-03-14 RX ORDER — FLUTICASONE PROPIONATE 50 MCG
SPRAY, SUSPENSION (ML) NASAL
Qty: 24 ML | Refills: 1 | OUTPATIENT
Start: 2025-03-14